# Patient Record
Sex: MALE | Race: WHITE | NOT HISPANIC OR LATINO | Employment: FULL TIME
[De-identification: names, ages, dates, MRNs, and addresses within clinical notes are randomized per-mention and may not be internally consistent; named-entity substitution may affect disease eponyms.]

---

## 2017-12-26 ENCOUNTER — RECORDS - HEALTHEAST (OUTPATIENT)
Dept: ADMINISTRATIVE | Facility: OTHER | Age: 37
End: 2017-12-26

## 2018-07-20 ENCOUNTER — RECORDS - HEALTHEAST (OUTPATIENT)
Dept: ADMINISTRATIVE | Facility: OTHER | Age: 38
End: 2018-07-20

## 2021-05-31 VITALS — HEIGHT: 64 IN | WEIGHT: 120 LBS | BODY MASS INDEX: 20.6 KG/M2 | BODY MASS INDEX: 20.6 KG/M2

## 2021-06-01 VITALS — BODY MASS INDEX: 21.46 KG/M2 | WEIGHT: 125 LBS

## 2021-06-13 ENCOUNTER — OFFICE VISIT (OUTPATIENT)
Dept: URGENT CARE | Facility: URGENT CARE | Age: 41
End: 2021-06-13

## 2021-06-13 VITALS
HEART RATE: 112 BPM | WEIGHT: 110 LBS | BODY MASS INDEX: 18.88 KG/M2 | TEMPERATURE: 98.2 F | DIASTOLIC BLOOD PRESSURE: 73 MMHG | OXYGEN SATURATION: 97 % | SYSTOLIC BLOOD PRESSURE: 114 MMHG

## 2021-06-13 DIAGNOSIS — R51.9 ACUTE NONINTRACTABLE HEADACHE, UNSPECIFIED HEADACHE TYPE: Primary | ICD-10-CM

## 2021-06-13 PROCEDURE — 99204 OFFICE O/P NEW MOD 45 MIN: CPT | Performed by: PHYSICIAN ASSISTANT

## 2021-06-13 RX ORDER — ONDANSETRON 4 MG/1
TABLET, FILM COATED ORAL
COMMUNITY
Start: 2020-08-11

## 2021-06-13 RX ORDER — PROMETHAZINE HYDROCHLORIDE 25 MG/1
12.5-25 TABLET ORAL
COMMUNITY
Start: 2021-04-15

## 2021-06-13 RX ORDER — PROPRANOLOL HYDROCHLORIDE 40 MG/1
40 TABLET ORAL
COMMUNITY
Start: 2021-06-04 | End: 2022-02-14

## 2021-06-13 RX ORDER — ZOLMITRIPTAN 5 MG/1
5 TABLET, FILM COATED ORAL
COMMUNITY
Start: 2021-06-04

## 2021-06-13 RX ORDER — HYDROCODONE BITARTRATE AND ACETAMINOPHEN 5; 325 MG/1; MG/1
1 TABLET ORAL
COMMUNITY
Start: 2021-02-18 | End: 2023-06-12

## 2021-06-13 RX ORDER — SUCRALFATE 1 G/1
1 TABLET ORAL
COMMUNITY
Start: 2020-07-15

## 2021-06-16 PROBLEM — R31.9 HEMATURIA: Status: ACTIVE | Noted: 2017-10-03

## 2021-06-16 PROBLEM — R10.9 RIGHT FLANK PAIN: Status: ACTIVE | Noted: 2017-10-03

## 2021-09-06 ENCOUNTER — HOSPITAL ENCOUNTER (EMERGENCY)
Facility: CLINIC | Age: 41
Discharge: LEFT BEFORE DISCHARGE | End: 2021-09-06

## 2021-09-06 VITALS
SYSTOLIC BLOOD PRESSURE: 107 MMHG | RESPIRATION RATE: 20 BRPM | WEIGHT: 120 LBS | DIASTOLIC BLOOD PRESSURE: 71 MMHG | OXYGEN SATURATION: 100 % | BODY MASS INDEX: 20.49 KG/M2 | HEART RATE: 102 BPM | HEIGHT: 64 IN | TEMPERATURE: 98 F

## 2021-09-06 ASSESSMENT — MIFFLIN-ST. JEOR: SCORE: 1365.32

## 2021-09-06 NOTE — ED TRIAGE NOTES
"History of headache and has had this frontal headache for 2 days. States has been out of \"emergency meds\" as prescribed by primary and was told if he ran out of those meds to go to ED and we will \"help him out\" for a couple of days. Has tried OTC meds but has not helped. Next appt with primary is this coming Friday  "

## 2021-11-30 ENCOUNTER — OFFICE VISIT (OUTPATIENT)
Dept: FAMILY MEDICINE | Facility: CLINIC | Age: 41
End: 2021-11-30

## 2021-11-30 VITALS
HEART RATE: 87 BPM | TEMPERATURE: 97.4 F | RESPIRATION RATE: 18 BRPM | WEIGHT: 119.93 LBS | DIASTOLIC BLOOD PRESSURE: 73 MMHG | BODY MASS INDEX: 20.59 KG/M2 | OXYGEN SATURATION: 98 % | SYSTOLIC BLOOD PRESSURE: 110 MMHG

## 2021-11-30 DIAGNOSIS — G43.719 INTRACTABLE CHRONIC MIGRAINE WITHOUT AURA AND WITHOUT STATUS MIGRAINOSUS: Primary | ICD-10-CM

## 2021-11-30 PROCEDURE — 99214 OFFICE O/P EST MOD 30 MIN: CPT | Performed by: PHYSICIAN ASSISTANT

## 2021-11-30 RX ORDER — HYDROCODONE BITARTRATE AND ACETAMINOPHEN 5; 325 MG/1; MG/1
1 TABLET ORAL EVERY 6 HOURS PRN
Qty: 2 TABLET | Refills: 0 | Status: SHIPPED | OUTPATIENT
Start: 2021-11-30 | End: 2021-12-01

## 2021-12-01 NOTE — PATIENT INSTRUCTIONS
Patient with history of chronic migraines who is on prophylactic and abortive medications. He gets [place] prescription by his PCP to control severe headaches. He is aware that I cannot refill is usual dose. I prescribed a one day supply of it. He needs to contact is PCP for any future refills as this is not appropriate for urgent care. Conservative measures discussed including rest and over-the-counter analgesics (Tylenol or Ibuprofen). See your primary care provider if symptoms do not improve in 5 days. Seek emergency care if you develop severe headache, stiff neck, vision changes, loss of consciousness, dizziness, or confusion.

## 2021-12-01 NOTE — PROGRESS NOTES
URGENT CARE VISIT:    SUBJECTIVE:   Jeff Chavez is a 41 year old male who comes in for evaluation of headache.  Headache began 2day(s)} ago.  DESCRIPTION OF HEADACHE:   Location of pain: bilateral   Character of pain:throbbing   Timing and onset: gradual onset and worsening  Radiation of pain: NO   Severity of pain: severe   Accompanying symptoms: none   Negative for: dizziness, focal weakness and sensory deficits    History of Migranes: Yes   Are most headaches similar in presentation: YES    TYPICAL PRECIPITANTS OF HEADACHE: none    CURRENT USE OF MEDS TO TREAT HA:   Abortive meds: zolmitriptan    Prophylactic meds: propanolol    PMH:   Past Medical History:   Diagnosis Date     Attention deficit disorder with hyperactivity 3/29/2015    Overview:  a system change updated this record. This will not affect patient care or billing. This comment can be deleted.       Closed fracture of part of tibia 12/2/2014     Equinus deformity of foot, acquired 1/23/2015     Major depressive disorder, single episode 3/29/2015     Migraine      Nephrolithiasis      Stomach ulcer      Tobacco use disorder 3/29/2015     Undersocialized conduct disorder, aggressive type 4/13/2007     Allergies: Nsaids (non-steroidal anti-inflammatory drug) [nsaids], Other food allergy, Strawberry, Tolmetin, and Tramadol   Medications:   Current Outpatient Medications   Medication Sig Dispense Refill     HYDROcodone-acetaminophen (NORCO) 5-325 MG tablet Take 1 tablet by mouth every 6 hours as needed for severe pain 2 tablet 0     HYDROcodone-acetaminophen (NORCO) 5-325 MG tablet Take 1 tablet by mouth       omeprazole (PRILOSEC) 20 MG DR capsule Take 20 mg by mouth       ondansetron (ZOFRAN) 4 MG tablet        promethazine (PHENERGAN) 25 MG tablet Take 12.5-25 mg by mouth       propranolol (INDERAL) 40 MG tablet Take 40 mg by mouth       sucralfate (CARAFATE) 1 GM tablet Take 1 g by mouth       ZOLMitriptan (ZOMIG) 5 MG tablet Take 5 mg by mouth        Social History:   Social History     Tobacco Use     Smoking status: Current Every Day Smoker     Packs/day: 1.00     Types: Cigarettes     Smokeless tobacco: Never Used   Substance Use Topics     Alcohol use: No     Comment: Alcoholic Drinks/day: rare       ROS:  General: negative   Skin: negative  Eyes: negative  Ears/Nose/Throat: negative  Respiratory: No shortness of breath, dyspnea on exertion, cough, or hemoptysis  Cardiovascular: negative  Gastrointestinal: negative  Genitourinary: negative  Musculoskeletal: negative  Neurologic: migraine headaches  Psychiatric: negative  Hematologic/Lymphatic/Immunologic: negative  Endocrine: negative      OBJECTIVE:  /73   Pulse 87   Temp 97.4  F (36.3  C) (Tympanic)   Resp 18   Wt 54.4 kg (119 lb 14.9 oz)   SpO2 98%   BMI 20.59 kg/m    GENERAL APPEARANCE: healthy, alert and no distress  EYES: EOMI,  PERRL, conjunctiva clear  RESP: lungs clear to auscultation - no rales, rhonchi or wheezes  CV: regular rates and rhythm, normal S1 S2, no murmur noted  NEURO: alert and oriented  SKIN: no suspicious lesions or rashes    ASSESSMENT:    ICD-10-CM    1. Intractable chronic migraine without aura and without status migrainosus  G43.719 HYDROcodone-acetaminophen (NORCO) 5-325 MG tablet       PLAN:  Patient Instructions   Patient with history of chronic migraines who is on prophylactic and abortive medications. He gets [place] prescription by his PCP to control severe headaches. He is aware that I cannot refill is usual dose. I prescribed a one day supply of it. He needs to contact is PCP for any future refills as this is not appropriate for urgent care. Conservative measures discussed including rest and over-the-counter analgesics (Tylenol or Ibuprofen). See your primary care provider if symptoms do not improve in 5 days. Seek emergency care if you develop severe headache, stiff neck, vision changes, loss of consciousness, dizziness, or confusion. Patient verbalized  understanding and is agreeable to plan. The patient was discharged ambulatory and in stable condition.    Emily Peña PA-C ....................  11/30/2021   6:27 PM

## 2021-12-29 ENCOUNTER — APPOINTMENT (OUTPATIENT)
Dept: CT IMAGING | Facility: CLINIC | Age: 41
End: 2021-12-29
Attending: EMERGENCY MEDICINE
Payer: COMMERCIAL

## 2021-12-29 ENCOUNTER — HOSPITAL ENCOUNTER (EMERGENCY)
Facility: CLINIC | Age: 41
Discharge: HOME | End: 2021-12-29
Attending: EMERGENCY MEDICINE | Admitting: EMERGENCY MEDICINE
Payer: COMMERCIAL

## 2021-12-29 VITALS
DIASTOLIC BLOOD PRESSURE: 84 MMHG | OXYGEN SATURATION: 98 % | RESPIRATION RATE: 16 BRPM | TEMPERATURE: 98.3 F | SYSTOLIC BLOOD PRESSURE: 131 MMHG | WEIGHT: 120 LBS | HEART RATE: 68 BPM | BODY MASS INDEX: 20.6 KG/M2

## 2021-12-29 DIAGNOSIS — K27.9 PEPTIC ULCER: ICD-10-CM

## 2021-12-29 LAB
ALBUMIN SERPL-MCNC: 4.4 G/DL (ref 3.5–5)
ALP SERPL-CCNC: 60 U/L (ref 45–120)
ALT SERPL W P-5'-P-CCNC: 16 U/L (ref 0–45)
ANION GAP SERPL CALCULATED.3IONS-SCNC: 12 MMOL/L (ref 5–18)
AST SERPL W P-5'-P-CCNC: 23 U/L (ref 0–40)
BASOPHILS # BLD AUTO: 0 10E3/UL (ref 0–0.2)
BASOPHILS NFR BLD AUTO: 0 %
BILIRUB DIRECT SERPL-MCNC: 0.2 MG/DL
BILIRUB SERPL-MCNC: 0.9 MG/DL (ref 0–1)
BUN SERPL-MCNC: 11 MG/DL (ref 8–22)
CALCIUM SERPL-MCNC: 9.2 MG/DL (ref 8.5–10.5)
CHLORIDE BLD-SCNC: 109 MMOL/L (ref 98–107)
CO2 SERPL-SCNC: 20 MMOL/L (ref 22–31)
CREAT SERPL-MCNC: 0.84 MG/DL (ref 0.7–1.3)
EOSINOPHIL # BLD AUTO: 0.1 10E3/UL (ref 0–0.7)
EOSINOPHIL NFR BLD AUTO: 1 %
ERYTHROCYTE [DISTWIDTH] IN BLOOD BY AUTOMATED COUNT: 12.3 % (ref 10–15)
GFR SERPL CREATININE-BSD FRML MDRD: >90 ML/MIN/1.73M2
GLUCOSE BLD-MCNC: 91 MG/DL (ref 70–125)
HCT VFR BLD AUTO: 43.6 % (ref 40–53)
HGB BLD-MCNC: 15.2 G/DL (ref 13.3–17.7)
IMM GRANULOCYTES # BLD: 0 10E3/UL
IMM GRANULOCYTES NFR BLD: 0 %
LIPASE SERPL-CCNC: 15 U/L (ref 0–52)
LYMPHOCYTES # BLD AUTO: 2.1 10E3/UL (ref 0.8–5.3)
LYMPHOCYTES NFR BLD AUTO: 23 %
MCH RBC QN AUTO: 32.6 PG (ref 26.5–33)
MCHC RBC AUTO-ENTMCNC: 34.9 G/DL (ref 31.5–36.5)
MCV RBC AUTO: 94 FL (ref 78–100)
MONOCYTES # BLD AUTO: 0.4 10E3/UL (ref 0–1.3)
MONOCYTES NFR BLD AUTO: 5 %
NEUTROPHILS # BLD AUTO: 6.4 10E3/UL (ref 1.6–8.3)
NEUTROPHILS NFR BLD AUTO: 71 %
NRBC # BLD AUTO: 0 10E3/UL
NRBC BLD AUTO-RTO: 0 /100
PLATELET # BLD AUTO: 163 10E3/UL (ref 150–450)
POTASSIUM BLD-SCNC: 3.9 MMOL/L (ref 3.5–5)
PROT SERPL-MCNC: 6.8 G/DL (ref 6–8)
RBC # BLD AUTO: 4.66 10E6/UL (ref 4.4–5.9)
SODIUM SERPL-SCNC: 141 MMOL/L (ref 136–145)
WBC # BLD AUTO: 9 10E3/UL (ref 4–11)

## 2021-12-29 PROCEDURE — 250N000011 HC RX IP 250 OP 636: Performed by: EMERGENCY MEDICINE

## 2021-12-29 PROCEDURE — 250N000013 HC RX MED GY IP 250 OP 250 PS 637: Performed by: EMERGENCY MEDICINE

## 2021-12-29 PROCEDURE — 82248 BILIRUBIN DIRECT: CPT | Performed by: PHYSICIAN ASSISTANT

## 2021-12-29 PROCEDURE — 85025 COMPLETE CBC W/AUTO DIFF WBC: CPT | Performed by: PHYSICIAN ASSISTANT

## 2021-12-29 PROCEDURE — 36415 COLL VENOUS BLD VENIPUNCTURE: CPT | Performed by: PHYSICIAN ASSISTANT

## 2021-12-29 PROCEDURE — 250N000009 HC RX 250: Performed by: EMERGENCY MEDICINE

## 2021-12-29 PROCEDURE — 83690 ASSAY OF LIPASE: CPT | Performed by: PHYSICIAN ASSISTANT

## 2021-12-29 PROCEDURE — 82310 ASSAY OF CALCIUM: CPT | Performed by: PHYSICIAN ASSISTANT

## 2021-12-29 PROCEDURE — 74177 CT ABD & PELVIS W/CONTRAST: CPT

## 2021-12-29 PROCEDURE — 99285 EMERGENCY DEPT VISIT HI MDM: CPT | Mod: 25

## 2021-12-29 RX ORDER — IOPAMIDOL 755 MG/ML
100 INJECTION, SOLUTION INTRAVASCULAR ONCE
Status: COMPLETED | OUTPATIENT
Start: 2021-12-29 | End: 2021-12-29

## 2021-12-29 RX ORDER — SUCRALFATE 1 G/1
1 TABLET ORAL 2 TIMES DAILY PRN
Qty: 20 TABLET | Refills: 0 | Status: SHIPPED | OUTPATIENT
Start: 2021-12-29

## 2021-12-29 RX ORDER — SUCRALFATE 1 G/1
1 TABLET ORAL ONCE
Status: COMPLETED | OUTPATIENT
Start: 2021-12-29 | End: 2021-12-29

## 2021-12-29 RX ORDER — HYDROCODONE BITARTRATE AND ACETAMINOPHEN 5; 325 MG/1; MG/1
1 TABLET ORAL EVERY 6 HOURS PRN
Qty: 12 TABLET | Refills: 0 | Status: SHIPPED | OUTPATIENT
Start: 2021-12-29 | End: 2022-01-01

## 2021-12-29 RX ADMIN — SUCRALFATE 1 G: 1 TABLET ORAL at 17:16

## 2021-12-29 RX ADMIN — LIDOCAINE HYDROCHLORIDE 30 ML: 20 SOLUTION TOPICAL at 17:40

## 2021-12-29 RX ADMIN — IOPAMIDOL 100 ML: 755 INJECTION, SOLUTION INTRAVENOUS at 18:20

## 2021-12-29 NOTE — ED TRIAGE NOTES
Patient is here with mid abdomen pain that started yesterday. He does have a hx of an ulcer and stated it feels the same.

## 2021-12-29 NOTE — ED PROVIDER NOTES
EMERGENCY DEPARTMENT ENCOUNTER            IMPRESSION:  Epigastric pain -suspect ulcer      MEDICAL DECISION MAKING:  Patient evaluated for epigastric abdominal pain.  He has a history of peptic ulcer disease.    On exam he appears uncomfortable.  He is tachycardic.  He has some tenderness epigastric no right upper quadrant.    He was given symptomatic medication    CBC, chemistry, lipase, LFTs are normal.    CT of the abdomen unremarkable.    He had some relief with the medications.    I suspect he has recurrent peptic ulcer disease.  He is stable and safe for discharge home.  I recommended increasing his PPI to twice a day.  He was given pain medication and Carafate.  He is to follow-up with primary care or GI.  Return precautions discussed      =================================================================  CHIEF COMPLAINT:  Chief Complaint   Patient presents with     Abdominal Pain         HPI  Jeff Chavez is a 41 year old male with a history of peptic ulcer disease, kidney stone, chronic gastritis, who presents to the ED solo via private vehicle for evaluation of abdominal pain.     Patient reports onset of epigastric abdominal pain beginning yesterday that comes and goes in waves. He has taken omeprazole, tylenol, TUMS, and lidocaine without relief. Patient endorses a history of ulcers and states that his current symptoms are the same as they were with previous ulcers. Denies any changes to bowel movements and states that he did eat a little today. Patient denies any nausea, and any other symptoms or complaints at this time.     ScHx: Patient denies any alcohol consumption.       I, Beata Goel am serving as a scribe to document services personally performed by Dr. Sumanth Guillermo MD, based on my observation and the provider's statements to me. I, Dr. Sumanth Guillermo MD attest that Beata Goel is acting in a scribe capacity, has observed my performance of the services and has documented them in accordance  with my direction.      REVIEW OF SYSTEMS   Constitutional: Denies fever, chills, unintentional weight loss or fatigue   Eyes: Denies visual changes or discharge    HENT: Denies sore throat, ear pain or neck pain  Respiratory: Denies cough or shortness of breath    Cardiovascular: Denies chest pain, palpitations or leg swelling  GI: Denies nausea, vomiting, or dark, bloody stools. Endorses epigastric abdominal pain.   : Denies hematuria, dysuria, or flank pain  Musculoskeletal: Denies any new back pain or new muscle/joint pains  Skin: Denies rash or wound  Neurologic: Denies current headache, new weakness, focal weakness, or sensory changes    Lymphatic: Denies swollen glands    Psychiatric: Denies depression, suicidal ideation or homicidal ideation.    Remainder of systems reviewed, unless noted in HPI all others negative.      PAST MEDICAL HISTORY:  Past Medical History:   Diagnosis Date     Attention deficit disorder with hyperactivity 3/29/2015    Overview:  a system change updated this record. This will not affect patient care or billing. This comment can be deleted.       Closed fracture of part of tibia 12/2/2014     Equinus deformity of foot, acquired 1/23/2015     Major depressive disorder, single episode 3/29/2015     Migraine      Nephrolithiasis      Stomach ulcer      Tobacco use disorder 3/29/2015     Undersocialized conduct disorder, aggressive type 4/13/2007       PAST SURGICAL HISTORY:  No past surgical history on file.      CURRENT MEDICATIONS:    HYDROcodone-acetaminophen (NORCO) 5-325 MG tablet  sucralfate (CARAFATE) 1 GM tablet  HYDROcodone-acetaminophen (NORCO) 5-325 MG tablet  omeprazole (PRILOSEC) 20 MG DR capsule  ondansetron (ZOFRAN) 4 MG tablet  promethazine (PHENERGAN) 25 MG tablet  propranolol (INDERAL) 40 MG tablet  sucralfate (CARAFATE) 1 GM tablet  ZOLMitriptan (ZOMIG) 5 MG tablet        ALLERGIES:  Allergies   Allergen Reactions     Nsaids (Non-Steroidal Anti-Inflammatory Drug)  "[Nsaids] Unknown     Informed NOT to use due to last bone graft.  Please check with patient before ordering or administering to patient .     Other Food Allergy Hives     Strawberries     Strawberry Hives     Tolmetin Other (See Comments)     Informed NOT to use due to last bone graft.  Please check with patient before ordering or administering to patient ., Informed NOT to use due to last bone graft.  Please check with patient before ordering or administering to patient .     Tramadol Other (See Comments)     \"seizures\"       FAMILY HISTORY:  No family history on file.    SOCIAL HISTORY:   Social History     Socioeconomic History     Marital status: Single     Spouse name: Not on file     Number of children: Not on file     Years of education: Not on file     Highest education level: Not on file   Occupational History     Not on file   Tobacco Use     Smoking status: Current Every Day Smoker     Packs/day: 1.00     Types: Cigarettes     Smokeless tobacco: Never Used   Substance and Sexual Activity     Alcohol use: No     Comment: Alcoholic Drinks/day: rare     Drug use: No     Sexual activity: Not on file   Other Topics Concern     Not on file   Social History Narrative     Not on file     Social Determinants of Health     Financial Resource Strain: Not on file   Food Insecurity: Not on file   Transportation Needs: Not on file   Physical Activity: Not on file   Stress: Not on file   Social Connections: Not on file   Intimate Partner Violence: Not on file   Housing Stability: Not on file       PHYSICAL EXAM:    /84   Pulse 105   Temp 98.3  F (36.8  C) (Oral)   Resp 16   Wt 54.4 kg (120 lb)   SpO2 100%   BMI 20.60 kg/m      Constitutional: Awake, alert, in no acute distress  Head: Normocephalic, atraumatic.  ENT: Mucous membranes moist. Posterior oropharynx appears normal.  Eyes: Pupils midrange and reactive ,no conjunctival discharge  Neck: No lymphadenopathy, no stridor, supple, soft tissue " swelling  Chest: No tenderness   Respiratory: Respirations even, unlabored. Lungs clear to ascultation bilaterally, in no acute respiratory distress.  Cardiovascular: Regular rate and rhythm.+2 radial pulses, equal bilaterally.  No murmurs.   GI: Abdomen soft, tender epigastric. No guarding or rebound. Bowel sounds intact on all 4 quadrants.   Back: No CVA tenderness.    Musculoskeletal: Moves all 4 extremities equally, strength symmetrical on bilateral uppers and lowers.  No peripheral edema  Integument: Warm, dry. No rash. No bruising or petechiae.  Lymphatic: No cervical lymphadenopathy  Neurologic: Alert & oriented x 3.   Psychiatric: Normal mood and affect. Normal judgement.    ED COURSE:    4:58 PM I met with the patient for the initial interview and physical examination. Discussed plan for treatment and workup in the ED.      LAB:  All pertinent labs reviewed and interpreted.  Results for orders placed or performed during the hospital encounter of 12/29/21   CT Abdomen Pelvis w Contrast    Impression    IMPRESSION:   1.  Negative CT of the abdomen and pelvis. No change from previous.   Basic metabolic panel   Result Value Ref Range    Sodium 141 136 - 145 mmol/L    Potassium 3.9 3.5 - 5.0 mmol/L    Chloride 109 (H) 98 - 107 mmol/L    Carbon Dioxide (CO2) 20 (L) 22 - 31 mmol/L    Anion Gap 12 5 - 18 mmol/L    Urea Nitrogen 11 8 - 22 mg/dL    Creatinine 0.84 0.70 - 1.30 mg/dL    Calcium 9.2 8.5 - 10.5 mg/dL    Glucose 91 70 - 125 mg/dL    GFR Estimate >90 >60 mL/min/1.73m2   Hepatic function panel   Result Value Ref Range    Bilirubin Total 0.9 0.0 - 1.0 mg/dL    Bilirubin Direct 0.2 <=0.5 mg/dL    Protein Total 6.8 6.0 - 8.0 g/dL    Albumin 4.4 3.5 - 5.0 g/dL    Alkaline Phosphatase 60 45 - 120 U/L    AST 23 0 - 40 U/L    ALT 16 0 - 45 U/L   Result Value Ref Range    Lipase 15 0 - 52 U/L   CBC with platelets and differential   Result Value Ref Range    WBC Count 9.0 4.0 - 11.0 10e3/uL    RBC Count 4.66 4.40 -  5.90 10e6/uL    Hemoglobin 15.2 13.3 - 17.7 g/dL    Hematocrit 43.6 40.0 - 53.0 %    MCV 94 78 - 100 fL    MCH 32.6 26.5 - 33.0 pg    MCHC 34.9 31.5 - 36.5 g/dL    RDW 12.3 10.0 - 15.0 %    Platelet Count 163 150 - 450 10e3/uL    % Neutrophils 71 %    % Lymphocytes 23 %    % Monocytes 5 %    % Eosinophils 1 %    % Basophils 0 %    % Immature Granulocytes 0 %    NRBCs per 100 WBC 0 <1 /100    Absolute Neutrophils 6.4 1.6 - 8.3 10e3/uL    Absolute Lymphocytes 2.1 0.8 - 5.3 10e3/uL    Absolute Monocytes 0.4 0.0 - 1.3 10e3/uL    Absolute Eosinophils 0.1 0.0 - 0.7 10e3/uL    Absolute Basophils 0.0 0.0 - 0.2 10e3/uL    Absolute Immature Granulocytes 0.0 <=0.4 10e3/uL    Absolute NRBCs 0.0 10e3/uL       RADIOLOGY:  Reviewed all pertinent imaging. Please see official radiology report.  CT Abdomen Pelvis w Contrast   Preliminary Result   IMPRESSION:    1.  Negative CT of the abdomen and pelvis. No change from previous.               MEDICATIONS GIVEN IN THE EMERGENCY:  Medications   lidocaine (viscous) (XYLOCAINE) 2 % 15 mL, alum & mag hydroxide-simethicone (MAALOX) 15 mL GI Cocktail (30 mLs Oral Given 12/29/21 1740)   sucralfate (CARAFATE) tablet 1 g (1 g Oral Given 12/29/21 1716)   iopamidol (ISOVUE-370) solution 100 mL (100 mLs Intravenous Given 12/29/21 1820)           NEW PRESCRIPTIONS STARTED AT TODAY'S ER VISIT:  New Prescriptions    HYDROCODONE-ACETAMINOPHEN (NORCO) 5-325 MG TABLET    Take 1 tablet by mouth every 6 hours as needed for pain    SUCRALFATE (CARAFATE) 1 GM TABLET    Take 1 tablet (1 g) by mouth 2 times daily as needed          FINAL DIAGNOSIS:    ICD-10-CM    1. Peptic ulcer  K27.9             At the conclusion of the encounter I discussed the results of all of the tests and the disposition. The questions were answered. The patient or family acknowledged understanding and was agreeable with the care plan.     NAME: Jeff Chavez  AGE: 41 year old male  YOB: 1980  MRN:  [id number]  EVALUATION DATE & TIME: 12/29/2021  4:39 PM    PCP: Pavan Billings    ED PROVIDER: Sumanth Guillermo M.D.      I, Beata Goel, am serving as a scribe to document services personally performed by Dr. Sumanth Guillermo based on my observation and the provider's statements to me. I, Sumanth Guillermo MD attest that Beata Goel is acting in a scribe capacity, has observed my performance of the services and has documented them in accordance with my direction.    Sumanth Guillermo M.D.  Emergency Medicine  [facility] EMERGENCY ROOM  [facility] [facility] [address]  [phone]  Dept: [phone]  12/29/2021        Sumanth Guillermo MD  12/29/21 1850

## 2021-12-30 NOTE — DISCHARGE INSTRUCTIONS
Increase your omeprazole to twice a day.  Take Carafate as needed.  Vicodin for pain.  You should follow-up with your primary care doctor for reevaluation and possible referral to GI.  Return if your pain worsens or if you have persistent vomiting

## 2022-02-14 ENCOUNTER — OFFICE VISIT (OUTPATIENT)
Dept: FAMILY MEDICINE | Facility: CLINIC | Age: 42
End: 2022-02-14
Payer: COMMERCIAL

## 2022-02-14 VITALS
WEIGHT: 113 LBS | BODY MASS INDEX: 19.4 KG/M2 | TEMPERATURE: 98.4 F | HEART RATE: 91 BPM | DIASTOLIC BLOOD PRESSURE: 72 MMHG | SYSTOLIC BLOOD PRESSURE: 111 MMHG | OXYGEN SATURATION: 100 %

## 2022-02-14 DIAGNOSIS — R51.9 CHRONIC INTRACTABLE HEADACHE, UNSPECIFIED HEADACHE TYPE: Primary | ICD-10-CM

## 2022-02-14 DIAGNOSIS — G89.29 CHRONIC INTRACTABLE HEADACHE, UNSPECIFIED HEADACHE TYPE: Primary | ICD-10-CM

## 2022-02-14 PROCEDURE — 99213 OFFICE O/P EST LOW 20 MIN: CPT | Performed by: NURSE PRACTITIONER

## 2022-02-14 RX ORDER — NICOTINE 21 MG/24HR
1 PATCH, TRANSDERMAL 24 HOURS TRANSDERMAL EVERY 24 HOURS
COMMUNITY
Start: 2021-06-18

## 2022-02-14 RX ORDER — OMEGA-3 FATTY ACIDS/FISH OIL 300-1000MG
200 CAPSULE ORAL PRN
COMMUNITY

## 2022-02-14 ASSESSMENT — ENCOUNTER SYMPTOMS
FEVER: 0
COUGH: 0
NAUSEA: 1

## 2022-02-14 NOTE — PROGRESS NOTES
"Assessment & Plan     Chronic intractable headache, unspecified headache type         Patient with typical migraine lasting over 24 hours.  Some confusion of whether his high dose propranolol was actually stopped. He says he tapered this down.   I see no evidence of this via care everywhere.  He was not put on another preventative despite chronic migraines.  Patient should clarify with his PCP who is outside of our system and start another preventative based on frequency of headaches.    Patient plans to schedule a follow-up appointment with neurology for his chronic headaches now that insurance is active.    For the current headache, unfortunately, with maximum use of NSAIDs and triptans today, we have very little else to treat migraines in our urgent care.  With peptic ulcer disease, steroids would not be a great choice either.    Recommended emergency room for further care.  Patient says he will go to [facility] ED.    No red flags noted on exam.          No follow-ups on file.    Melissa Tenorio [facility]    Yonny Aguilar is a 42 year old male who presents to clinic today for the following health issues:  Chief Complaint   Patient presents with     Headache     migraine x 1 day      HPI    Patient with a history of migraine presents to the walk-in clinic with migraine.  Typical migraine.  Center of forehead.  Sharp, throbbing.  Currently 7/10.        Of note, he was also diagnosed with peptic ulcer disease on December 29, 2021. Was taking motrin \"all day\" today.       Previously was getting hydrocodone from PCP for chronic migraines.  He has no preventative medication (was previously on Propranolol 80 mg bid  and this was stopped by PCP, but no record of this was found) and zolmitriptan - took maximum dose already today.    Haven't seen neurologist in a year.  Didn't have insurance until recently.          Review of Systems   Constitutional: Negative for fever.   HENT: " Negative for congestion.    Eyes:        +Problems with sound    Respiratory: Negative for cough.    Cardiovascular: Negative for chest pain and palpitations.   Gastrointestinal: Positive for nausea.           Objective    /72 (BP Location: Right arm, Patient Position: Right side, Cuff Size: Adult Small)   Pulse 91   Temp 98.4  F (36.9  C) (Tympanic)   Wt 51.3 kg (113 lb)   SpO2 100%   BMI 19.40 kg/m    Physical Exam  Constitutional:       Appearance: He is well-developed.   HENT:      Right Ear: External ear normal.      Left Ear: External ear normal.   Eyes:      General:         Right eye: No discharge.         Left eye: No discharge.      Conjunctiva/sclera: Conjunctivae normal.      Pupils: Pupils are equal, round, and reactive to light.   Cardiovascular:      Pulses: Normal pulses.   Pulmonary:      Effort: Pulmonary effort is normal.   Musculoskeletal:         General: Normal range of motion.   Skin:     General: Skin is warm.   Neurological:      Mental Status: He is alert and oriented to person, place, and time.      Motor: No weakness.      Gait: Gait normal.   Psychiatric:         Mood and Affect: Mood normal.         Behavior: Behavior normal.         Thought Content: Thought content normal.         Judgment: Judgment normal.

## 2022-02-14 NOTE — PATIENT INSTRUCTIONS
Call Dr. Painter's office to clarify propranolol (no indication in your chart that he stopped it) or other preventative medication and referral for neurology.

## 2022-02-17 ASSESSMENT — ENCOUNTER SYMPTOMS: PALPITATIONS: 0

## 2022-02-27 ENCOUNTER — OFFICE VISIT (OUTPATIENT)
Dept: FAMILY MEDICINE | Facility: CLINIC | Age: 42
End: 2022-02-27
Payer: COMMERCIAL

## 2022-02-27 VITALS
TEMPERATURE: 97.5 F | OXYGEN SATURATION: 100 % | HEART RATE: 97 BPM | DIASTOLIC BLOOD PRESSURE: 79 MMHG | SYSTOLIC BLOOD PRESSURE: 125 MMHG

## 2022-02-27 DIAGNOSIS — R11.2 NAUSEA AND VOMITING, INTRACTABILITY OF VOMITING NOT SPECIFIED, UNSPECIFIED VOMITING TYPE: Primary | ICD-10-CM

## 2022-02-27 DIAGNOSIS — G43.009 MIGRAINE WITHOUT AURA AND WITHOUT STATUS MIGRAINOSUS, NOT INTRACTABLE: ICD-10-CM

## 2022-02-27 PROCEDURE — 99213 OFFICE O/P EST LOW 20 MIN: CPT | Performed by: FAMILY MEDICINE

## 2022-02-27 RX ORDER — HYDROCODONE BITARTRATE AND ACETAMINOPHEN 5; 325 MG/1; MG/1
1 TABLET ORAL EVERY 8 HOURS PRN
Qty: 6 TABLET | Refills: 0 | Status: SHIPPED | OUTPATIENT
Start: 2022-02-27 | End: 2022-03-01

## 2022-02-27 RX ORDER — ONDANSETRON 4 MG/1
4 TABLET, ORALLY DISINTEGRATING ORAL EVERY 8 HOURS PRN
Qty: 10 TABLET | Refills: 0 | Status: SHIPPED | OUTPATIENT
Start: 2022-02-27 | End: 2023-06-12

## 2022-02-27 NOTE — PATIENT INSTRUCTIONS
Patient Education     About Migraine Headaches  What is a migraine headache?  A migraine is a special kind of headache. It can last a for hours or days. It may cause intense pain. You may also feel sick to your stomach or have eye problems.  What causes it?  We don't know the exact cause. They may be caused by a problem with blood flow to the brain. Or they may happen when brain chemicals don't stay balanced. You are more likely to get a migraine when:    You are under stress    You are tired    You eat some kinds of foods, such as wine, cheese, chocolate or chemicals added to foods    You are around bright lights  Women are more likely to have migraines than men. Sometimes the headaches happen around the time a woman has her period. Or they may happen when a woman is taking hormone pills.   Migraines can run in families.  What are symptoms?  Before a migraine, you may:    Not feel well    Lose part of your vision    See bright spots    See zigzags in front of your eyes  Most of the time, these problems go away when the headache starts. When you have a migraine, you may:    Have a headache that throbs or pounds (you may feel the pain more on one side of your head, or your whole head may hurt)    Be very sensitive to light    Have blurry vision    Vomit (throw up) or have nausea (feel sick to your stomach)    Have numbness or tingling in your face or arm  How is it diagnosed?  Your care team will ask you about your symptoms and medical history. They will examine you.   It may help to keep a headache diary. You should write down:    The date and time of each headache    How long it lasts    The type of pain (is it dull or sharp? Does it throb? Do you feel pressure?)    Where it hurts (for example, scalp, eyes, temples, neck)    What symptoms you had before the headache started    What you ate and drank before the headache    If you used cigarettes, caffeine, alcohol or soda    What time you went to bed the night  before, and what time you woke up that day    If you are a woman, you should also note if you are using birth control pills or taking other female hormones  If you just recently started having headaches, your care team may suggest tests to look for the causes of your symptoms. You may need a brain scan or MRI.  How is it treated?    You may need to take medicines to keep migraines from getting worse once they start. Take these medicines as soon as you can when you start to have signs of a migraine.    You may need to take another medicine every day to stop migraines from coming so often. The medicine can help prevent very bad migraines.    You may need to try a medicine for a few weeks to see if it works. Be sure to keep your follow-up appointments with your care team to see if a medicine is working and what you should try next. Talk to your care team about what is best for you. You may have many choices.  How long will it last?  The headache may last from a few hours to a few days. You may get migraines for the rest of your life. Most of the time, migraines happen less often as you get older.  How can I take care of myself?  As soon as the migraine starts:    Take a pain reliever. Ask your care team what medicine you should take.    Rest in a quiet, dark room until you start to feel better.    Don't drive a car while you have a migraine.    See your care team if your headaches get worse or if they don't get better when you take medicine for them. It may take a few visits to find the best way to control your headaches.  How can I prevent migraines?    Eat regular, healthy meals. Don't go too long without eating. Stay away from foods that seem to cause headaches. Watch out for:  ? Wine, staci and beer  ? Cheese  ? Aged, canned and processed meats  ? Bread made with yeast  ? Foods with cheese, chocolate or nuts    Don't use medicines that can cause headaches. Ask your care team about this. You may need to stop using  birth control or hormone pills.    Don't smoke cigarettes    Get plenty of sleep every day    Lower your stress. Find time to relax, rest and have fun in your life.  When should I call my care team?  Call your care team right away if you have symptoms that you don't usually get with migraines or you have other unusual symptoms, such as:    Problems talking or your speech is slurred    A weak arm or leg that you can't move in a normal way    A fever higher than 100 F (37.8 C)    Stiff neck    Vomiting that lasts for a few hours  For more information  National Headache Foundation (NHF)  www.headaches.org.  For informational purposes only. Not to replace the advice of your health care provider.   Copyright   2011 [employer]. All rights reserved. Clinically reviewed by [facility]. [employer] [id number] - 08/18.

## 2022-02-27 NOTE — PROGRESS NOTES
SUBJECTIVE:  Jeff Chavez, a 42 year old male scheduled an appointment to discuss the following issues:     Nausea and vomiting, intractability of vomiting not specified, unspecified vomiting type  Migraine without aura and without status migrainosus, not intractable    Medical, social, surgical, and family histories reviewed.     Headache (Migraine ongoing x2 days.)    History of chronic recurrent migraines since age 18.  Usual migraine is characterized by frontal headache which is throbbing with decreased sleep, light sensitivity & smell sensitivity but no aura.  No visual or speech disturbances, no paresthesia or saddle anesthesia.  Patient tells me that he cannot tolerate NSAIDs, Toradol has not worked last took Motrin 800 mg half an hour ago.  Zofran has helped nausea in the past. Previously the only cocktail that worked for him  Or his persistent migraine were Zomig+Phenergan+hydrocodone.  He will be seeing his primary care physician tomorrow and is asking for some relief at this time.  There has not been any changes of his migraine characteristics.  No head trauma or injury.  Denies any alcohol or drug involvement.  Patient tells me he has cut down on his caffeine consumption.    ROS:  See HPI.  Some nausea but no vomiting.  no fever/chills.  No chest pain/SOB.  No BM/urine problems.  No dizziness or syncope.      OBJECTIVE:  /79 (BP Location: Right arm, Patient Position: Sitting, Cuff Size: Adult Regular)   Pulse 97   Temp 97.5  F (36.4  C) (Tympanic)   SpO2 100%   EXAM:  GENERAL APPEARANCE: alert and moderate distress; afebrile, no cyanosis or accessory muscle use, no meningeal signs, not septic, appears uncomfortable  EYES: Eyes grossly normal to inspection, PERRL and conjunctivae and sclerae normal; no nystagmus, normal EOM  HENT: ear canals and TM's normal and nose and mouth without ulcers or lesions  NECK: no adenopathy, no asymmetry, masses, or scars and thyroid normal to palpation  RESP:  lungs clear to auscultation - no rales, rhonchi or wheezes  CV: regular rates and rhythm, normal S1 S2, no S3 or S4 and no murmur, click or rub  LYMPHATICS: no cervical adenopathy  ABDOMEN: soft, nontender, without hepatosplenomegaly or masses and bowel sounds normal  MS: extremities normal- no gross deformities noted  SKIN: no suspicious lesions or rashes  NEURO: Normal strength and tone, mentation intact and speech normal; no focal neurological deficits, no facial asymmetry or pronator drift, normal gait; normal heel and toe walk      ASSESSMENT/PLAN:  (R11.2) Nausea and vomiting, intractability of vomiting not specified, unspecified vomiting type  (primary encounter diagnosis)  Plan: ondansetron (ZOFRAN-ODT) 4 MG ODT tab      (G43.009) Migraine without aura and without status migrainosus, not intractable  Plan: HYDROcodone-acetaminophen (NORCO) 5-325 MG         tablet      Care instructions given.  Patient to stay hydrated with adequate fluid intake.  Pt to f/up PCP as scheduled, be seen sooner if no improvement or worsening.  Warning signs and symptoms explained.

## 2022-06-28 ENCOUNTER — HOSPITAL ENCOUNTER (EMERGENCY)
Facility: HOSPITAL | Age: 42
Discharge: HOME | End: 2022-06-28
Attending: EMERGENCY MEDICINE | Admitting: EMERGENCY MEDICINE
Payer: COMMERCIAL

## 2022-06-28 VITALS
TEMPERATURE: 99.5 F | SYSTOLIC BLOOD PRESSURE: 115 MMHG | BODY MASS INDEX: 20.6 KG/M2 | OXYGEN SATURATION: 98 % | DIASTOLIC BLOOD PRESSURE: 66 MMHG | RESPIRATION RATE: 16 BRPM | HEART RATE: 79 BPM | WEIGHT: 120 LBS

## 2022-06-28 DIAGNOSIS — F11.93 OPIATE WITHDRAWAL (H): ICD-10-CM

## 2022-06-28 PROCEDURE — 99284 EMERGENCY DEPT VISIT MOD MDM: CPT

## 2022-06-28 PROCEDURE — 250N000013 HC RX MED GY IP 250 OP 250 PS 637: Performed by: EMERGENCY MEDICINE

## 2022-06-28 RX ORDER — BUPRENORPHINE AND NALOXONE 4; 1 MG/1; MG/1
1 FILM, SOLUBLE BUCCAL; SUBLINGUAL DAILY
Status: DISCONTINUED | OUTPATIENT
Start: 2022-06-28 | End: 2022-06-28 | Stop reason: CLARIF

## 2022-06-28 RX ORDER — BUPRENORPHINE HYDROCHLORIDE AND NALOXONE HYDROCHLORIDE DIHYDRATE 2; .5 MG/1; MG/1
2 TABLET SUBLINGUAL ONCE
Status: COMPLETED | OUTPATIENT
Start: 2022-06-28 | End: 2022-06-28

## 2022-06-28 RX ORDER — BUPRENORPHINE AND NALOXONE 4; 1 MG/1; MG/1
1 FILM, SOLUBLE BUCCAL; SUBLINGUAL 2 TIMES DAILY
Qty: 10 FILM | Refills: 0 | Status: SHIPPED | OUTPATIENT
Start: 2022-06-28 | End: 2023-06-12

## 2022-06-28 RX ADMIN — BUPRENORPHINE HYDROCHLORIDE AND NALOXONE HYDROCHLORIDE DIHYDRATE 2 TABLET: 2; .5 TABLET SUBLINGUAL at 17:55

## 2022-06-28 ASSESSMENT — ENCOUNTER SYMPTOMS
NERVOUS/ANXIOUS: 1
NAUSEA: 0
HEADACHES: 0
AGITATION: 1
VOMITING: 0
SLEEP DISTURBANCE: 1
DIARRHEA: 0
EYE DISCHARGE: 0
SHORTNESS OF BREATH: 0
MYALGIAS: 0
TROUBLE SWALLOWING: 0
WOUND: 0
CHILLS: 0
RHINORRHEA: 0

## 2022-06-28 NOTE — ED NOTES
"ED Provider In Triage Note  [facility]  Encounter Date: Jun 28, 2022    Chief Complaint   Patient presents with     Withdrawal     Brief HPI:   Jeff Chavez is a pleasant 42 year old male presenting to the Emergency Department with a chief complaint of \"opiate withdrawal\".  Body aches, insomnia, restless.  Nausea.  PMhx oral oxycodone abuse.  Last opiates on Sunday evening, wants to stop.   Has appointment for suboxone initiation on Friday set up by PCP.  Referred to ED by pcp secondary to current symptoms. Patient looking to bridge until that follow up.      Note: Suboxone 4 mg ordered from triage    Brief Physical Exam:  BP (!) 144/66   Pulse (!) 123   Temp 99.5  F (37.5  C) (Temporal)   Wt 54.4 kg (120 lb)   SpO2 97%   BMI 20.60 kg/m    General: Non-toxic appearing  HEENT: Atraumatic  Resp: No respiratory distress  Abdomen: Non-peritoneal  Neuro: Alert, oriented, answers questions appropriately  Psych: Behavior appropriate    Plan Initiated in Triage:  suboxone ordered    PIT Dispo:   [facility] while awaiting workup and ED bed availability    Duncan Andino MD on 6/28/2022 at 5:29 PM    Patient was evaluated by the Physician in Triage due to a limitation of available rooms in the Emergency Department. A plan of care was discussed based on the information obtained on the initial evaluation and patient was consuled to return back to the Emergency Department lobby after this initial evalutaiton until results were obtained or a room became available in the Emergency Department. Patient was counseled not to leave prior to receiving the results of their workup.     Duncan Andino MD  [facility] EMERGENCY DEPARTMENT  [address] [place] [id number]  [phone]       Duncan Andino MD  06/28/22 1735    "

## 2022-06-28 NOTE — ED PROVIDER NOTES
EMERGENCY DEPARTMENT ENCOUNTER      NAME: Jeff Chavez  AGE: 42 year old male  YOB: 1980  MRN: [id number]  EVALUATION DATE & TIME: 6/28/2022  6:43 PM    PCP: Pavan Billings    ED PROVIDER: Zheng Louise MD        Chief Complaint   Patient presents with     Withdrawal         FINAL IMPRESSION:  1. Opiate withdrawal (H)          ED COURSE & MEDICAL DECISION MAKING:    Pertinent Labs & Imaging studies reviewed. (See chart for details)  42 year old male presents to the Emergency Department for evaluation of symptoms consistent with opiate withdrawal and requesting Suboxone.     History of opiate use disorder.  Last use on Sunday.  Uses oral pills no injections.  Scheduled to see addiction medicine for Suboxone initiation in 4 days from now.  Has myalgias, anxiousness, cramps, tearing, yawning, restlessness and tremors.     Got Suboxone in triage and now feels much better.  Current COWS score 4.     Discussed Suboxone.  Patient wants to initiate treatment.  No known contraindications. Will prescribe twice a day for 5 days to bridge through his next appointment.  Will prescribe Narcan.  Discussed with patient he needs to teach his friends and family how to use Narcan.     At the conclusion of the encounter I discussed the results of all of the tests and the disposition. The questions were answered. The patient or family acknowledged understanding and was agreeable with the care plan.      7:00 PM I met with patient for initial encounter.          MEDICATIONS GIVEN IN THE EMERGENCY:  Medications   buprenorphine-naloxone (SUBOXONE) 2-0.5 MG sublingual tablet 2 tablet (2 tablets Sublingual Given 6/28/22 1755)       NEW PRESCRIPTIONS STARTED AT TODAY'S ER VISIT  New Prescriptions    BUPRENORPHINE HCL-NALOXONE HCL (SUBOXONE) 4-1 MG PER FILM    Place 1 Film under the tongue 2 times daily    NALOXONE (NARCAN) 4 MG/0.1ML NASAL SPRAY    Spray 1 spray (4 mg) into one nostril alternating nostrils as needed for  "opioid reversal every 2-3 minutes until assistance arrives          =================================================================    HPI    Triage note  \"  Pt states withdrawing from opiates. Has appt on Friday at  Clinic,  Last used oxycodone on Sunday.     Triage Assessment     Row Name 06/28/22 1731       Triage Assessment (Adult)    Airway WDL WDL              \"      Patient information was obtained from: Patient    Use of : N/A         Jeff Chavez is a 42 year old male who presents to this ED via walk-in for evaluation of withdrawal.    Patient states he is currently in recovery for opioid addiction and that he last took oxycodone on Sunday afternoon. Patient reports he has been feeling agitated, anxious, fidgety, and is having body aches. He states he was sweating and unable to sleep last night. He states he has a history of ulcers and migraines.    Patient denies chills, NVD, and all other relevant symptoms.       REVIEW OF SYSTEMS   Review of Systems   Constitutional: Negative for chills.   HENT: Negative for rhinorrhea and trouble swallowing.    Eyes: Negative for discharge.   Respiratory: Negative for shortness of breath.    Gastrointestinal: Negative for diarrhea, nausea and vomiting.   Musculoskeletal: Negative for myalgias.        Positive for body aches.   Skin: Negative for wound.   Neurological: Negative for headaches.   Psychiatric/Behavioral: Positive for agitation and sleep disturbance. The patient is nervous/anxious.        PAST MEDICAL HISTORY:  Past Medical History:   Diagnosis Date     Attention deficit disorder with hyperactivity 3/29/2015    Overview:  a system change updated this record. This will not affect patient care or billing. This comment can be deleted.       Closed fracture of part of tibia 12/2/2014     Equinus deformity of foot, acquired 1/23/2015     Major depressive disorder, single episode 3/29/2015     Migraine      Nephrolithiasis      Stomach ulcer      " "Tobacco use disorder 3/29/2015     Undersocialized conduct disorder, aggressive type 4/13/2007       PAST SURGICAL HISTORY:  History reviewed. No pertinent surgical history.        CURRENT MEDICATIONS:    buprenorphine HCl-naloxone HCl (SUBOXONE) 4-1 MG per film  naloxone (NARCAN) 4 MG/0.1ML nasal spray  HYDROcodone-acetaminophen (NORCO) 5-325 MG tablet  ibuprofen (ADVIL/MOTRIN) 200 MG capsule  nicotine (NICODERM CQ) 21 MG/24HR 24 hr patch  omeprazole (PRILOSEC) 20 MG DR capsule  ondansetron (ZOFRAN) 4 MG tablet  ondansetron (ZOFRAN-ODT) 4 MG ODT tab  promethazine (PHENERGAN) 25 MG tablet  sucralfate (CARAFATE) 1 GM tablet  sucralfate (CARAFATE) 1 GM tablet  ZOLMitriptan (ZOMIG) 5 MG tablet        ALLERGIES:  Allergies   Allergen Reactions     Other Food Allergy Hives     Strawberries     Strawberry Hives     Tramadol Other (See Comments)     \"seizures\"       FAMILY HISTORY:  No family history on file.    SOCIAL HISTORY:   Social History     Socioeconomic History     Marital status: Single     Spouse name: None     Number of children: None     Years of education: None     Highest education level: None   Tobacco Use     Smoking status: Former Smoker     Packs/day: 1.00     Types: Cigarettes     Smokeless tobacco: Never Used   Substance and Sexual Activity     Alcohol use: No     Comment: Alcoholic Drinks/day: rare     Drug use: No       VITALS:  /71   Pulse 80   Temp 99.5  F (37.5  C) (Temporal)   Wt 54.4 kg (120 lb)   SpO2 98%   BMI 20.60 kg/m      PHYSICAL EXAM      Vitals: /71   Pulse 80   Temp 99.5  F (37.5  C) (Temporal)   Wt 54.4 kg (120 lb)   SpO2 98%   BMI 20.60 kg/m    General: Appears in no acute distress, awake, alert, interactive.  Eyes: Conjunctivae non-injected. Sclera anicteric.  HENT: Atraumatic.  Pupils midsized.  No yawning  Neck: Supple.  Respiratory/Chest: Respiration unlabored.  Abdomen: non distended  Musculoskeletal: Normal extremities. No edema or erythema.  Skin: Normal " color. No rash or diaphoresis.  No piloerection  Neurologic: Face symmetric, moves all extremities spontaneously.  Mild visible tremor to outstretched hands  Psychiatric: Oriented to person, place, and time. Affect appropriate.  COWS score of 4.       LAB:  All pertinent labs reviewed and interpreted.       RADIOLOGY:  Reviewed all pertinent imaging. Please see official radiology report.  No orders to display           IRomero, am serving as a scribe to document services personally performed by Pavan Louise MD based on my observation and the provider's statements to me. I, Dr. Pavan Louise, attest that Romero Dacosta is acting in a scribe capacity, has observed my performance of the services and has documented them in accordance with my direction.    Pavan Louise MD  Emergency Medicine  [facility] EMERGENCY DEPARTMENT  [facility] [facility] [address]  [phone]       Pavan Louise MD  06/28/22 2617

## 2022-06-28 NOTE — ED TRIAGE NOTES
Pt states withdrawing from opiates. Has appt on Friday at  Clinic,  Last used oxycodone on Sunday.     Triage Assessment     Row Name 06/28/22 3910       Triage Assessment (Adult)    Airway WDL WDL

## 2022-06-29 NOTE — DISCHARGE INSTRUCTIONS
As we discussed congratulations on your sobriety.  Recommend Suboxone twice a day.  If you do relapse and overdose make sure you administer Narcan or have your friends administer Narcan to you and call 911.  Keep your addiction medicine appointment on Friday.

## 2022-06-30 ENCOUNTER — HOSPITAL ENCOUNTER (EMERGENCY)
Facility: HOSPITAL | Age: 42
Discharge: HOME | End: 2022-06-30
Attending: EMERGENCY MEDICINE | Admitting: EMERGENCY MEDICINE
Payer: COMMERCIAL

## 2022-06-30 VITALS
HEART RATE: 71 BPM | TEMPERATURE: 98.3 F | SYSTOLIC BLOOD PRESSURE: 118 MMHG | BODY MASS INDEX: 18.78 KG/M2 | OXYGEN SATURATION: 100 % | HEIGHT: 64 IN | RESPIRATION RATE: 18 BRPM | DIASTOLIC BLOOD PRESSURE: 77 MMHG | WEIGHT: 110 LBS

## 2022-06-30 DIAGNOSIS — F11.20 UNCOMPLICATED OPIOID DEPENDENCE (H): ICD-10-CM

## 2022-06-30 PROCEDURE — 250N000013 HC RX MED GY IP 250 OP 250 PS 637: Performed by: EMERGENCY MEDICINE

## 2022-06-30 PROCEDURE — 99283 EMERGENCY DEPT VISIT LOW MDM: CPT

## 2022-06-30 RX ADMIN — BUPRENORPHINE HYDROCHLORIDE 4 MG: 8 TABLET SUBLINGUAL at 19:20

## 2022-06-30 NOTE — ED PROVIDER NOTES
"EMERGENCY DEPARTMENT NOTE     Name: Jeff Chavez    Age/Sex: 42 year old male   MRN: [id number]   Evaluation Date & Time:  No admission date for patient encounter.    PCP:    Med[facility] Bae[facility] [place]   ED Provider: David Maguire D.O.       CHIEF COMPLAINT    Medication Concern       DIAGNOSIS & DISPOSITION     1. Uncomplicated opioid dependence (H)      DISPOSITION: Home    At the conclusion of the encounter I discussed the results of all of the tests and the disposition. The questions were answered. The patient or family acknowledged understanding and was agreeable with the care plan.    TOTAL CRITICAL CARE TIME (EXCLUDING PROCEDURES): Not applicable    PROCEDURES:   None    EMERGENCY DEPARTMENT COURSE/MEDICAL DECISION MAKING   6:58 PM I met with the patient to gather history and to perform my initial exam.  We discussed treatment options and the plan for care while in the Emergency Department.    Jeff Chavez is a 42 year old male with relevant past history of opiate dependency, chronic headaches who presents to the emergency department for evaluation for medication adjustments  Triage note reviewed:Patient arrives to triage with chief complaint of medication concern with Suboxone which was prescribed Monday this week.  Has an appointment with primary tomorrow to discuss starting  Subutex instead.  Reports increased migraines r/t Suboxone.  Patient was told to stop taking Suboxone today and to come to ED.  Alert and oriented x4.         Vital signs:/77   Pulse 71   Temp 98.3  F (36.8  C) (Oral)   Resp 18   Ht 1.626 m (5' 4\")   Wt 49.9 kg (110 lb)   SpO2 100%   BMI 18.88 kg/m    Pertinent physical exam findings:  HEENT: Head atraumatic, neck supple without meningeal irritation  Neuro: Cranial nerves 2 through 12 are intact.  5 out of 5 motor strength upper and lower extremities.  Intact sensation to light touch and positional sense.  No pronator drift.  Normal cerebellar " function with serial fingers and heel-to-shin.  Diagnostic studies:  Imaging:  No orders to display      Lab:  Labs Ordered and Resulted from Time of ED Arrival to Time of ED Departure - No data to display   Interventions: Buprenorphine  Medical decision making: Patient has history of chronic headaches which are longstanding and in typical pattern.  Low suspicion for other process including subarachnoid hemorrhage or leakage, brain mass with increased ICP or other acute process.  Patient is scheduled to see addiction specialist tomorrow.  He has had chronic ongoing headaches which are somewhat worse after starting the Suboxone recently prescribed.  Patient reports no use of Suboxone today.  In discussion with the addiction specialist it was felt that changed to Subutex may be helpful.  Patient was given 1 dose of Subutex here in the emergency department and will follow-up with the addiction specialist as scheduled tomorrow.  Patient will also continue Tylenol and ibuprofen for headache management.  Patient will return to the emergency department if problems with follow-up or worsening headaches.      ED INTERVENTIONS     Medications   buprenorphine (SUBUTEX) sublingual half-tab 4 mg (4 mg Sublingual Given 6/30/22 1920)       DISCHARGE MEDICATIONS        Review of your medicines      UNREVIEWED medicines. Ask your doctor about these medicines      Dose / Directions   buprenorphine HCl-naloxone HCl 4-1 MG per film  Commonly known as: SUBOXONE      Dose: 1 Film  Place 1 Film under the tongue 2 times daily  Quantity: 10 Film  Refills: 0     HYDROcodone-acetaminophen 5-325 MG tablet  Commonly known as: NORCO      Dose: 1 tablet  Take 1 tablet by mouth  Refills: 0     ibuprofen 200 MG capsule  Commonly known as: ADVIL/MOTRIN      Dose: 200 mg  Take 200 mg by mouth as needed  Refills: 0     naloxone 4 MG/0.1ML nasal spray  Commonly known as: NARCAN      Dose: 4 mg  Spray 1 spray (4 mg) into one nostril alternating nostrils  as needed for opioid reversal every 2-3 minutes until assistance arrives  Quantity: 0.2 mL  Refills: 0     nicotine 21 MG/24HR 24 hr patch  Commonly known as: NICODERM CQ      Dose: 1 patch  Place 1 patch onto the skin every 24 hours  Refills: 0     omeprazole 20 MG DR capsule  Commonly known as: priLOSEC      Dose: 20 mg  Take 20 mg by mouth  Refills: 0     ondansetron 4 MG ODT tab  Commonly known as: ZOFRAN ODT  Used for: Nausea and vomiting, intractability of vomiting not specified, unspecified vomiting type      Dose: 4 mg  Take 1 tablet (4 mg) by mouth every 8 hours as needed for nausea or vomiting  Quantity: 10 tablet  Refills: 0     ondansetron 4 MG tablet  Commonly known as: ZOFRAN      Refills: 0     promethazine 25 MG tablet  Commonly known as: PHENERGAN      Dose: 12.5-25 mg  Take 12.5-25 mg by mouth  Refills: 0     * sucralfate 1 GM tablet  Commonly known as: CARAFATE      Dose: 1 g  Take 1 g by mouth  Refills: 0     * sucralfate 1 GM tablet  Commonly known as: CARAFATE      Dose: 1 g  Take 1 tablet (1 g) by mouth 2 times daily as needed  Quantity: 20 tablet  Refills: 0     ZOLMitriptan 5 MG tablet  Commonly known as: ZOMIG      Dose: 5 mg  Take 5 mg by mouth  Refills: 0         * This list has 2 medication(s) that are the same as other medications prescribed for you. Read the directions carefully, and ask your doctor or other care provider to review them with you.                  INFORMATION SOURCE AND LIMITATIONS    History/Exam limitations: None  Patient information was obtained from: Patient  Use of : N/A    HISTORY OF PRESENT ILLNESS   Jeff Chavez is a 42 year old year old male who presents to this ED via walk-in for evaluation of a medication concern.    Patient states he has been taking suboxone (4 mg 2x a day) for opioid withdrawal but it has been giving him severe headaches. States he needs subutex until he can meet with his doctor tomorrow and get a new prescription. States he  "last took suboxone last night.        REVIEW OF SYSTEMS:   Constitutional: Negative for  fever.   HENT: Negative for URI symptoms or sore throat.    Cardiac: Negative for  chest pain,palpitations, near syncope or syncope  Respiratory: Negative for cough and shortness of breath.    Gastrointestinal: Negative for abdominal pain, nausea, vomiting, constipation, diarrhea, rectal bleeding or melena.  Genitourinary: Negative for dysuria, flank pain and hematuria.   Musculoskeletal: Negative for back pain.   Skin: Negative for  rash  Neurological: Negative for dizziness, syncope, speech difficulty, unilateral weakness or imbalance with walking. Positive for headaches.  Hematological: Negative for adenopathy. Does not bruise/bleed easily.   Psychiatric/Behavioral: Negative for confusion.       PATIENT HISTORY     Past Medical History:   Diagnosis Date     Attention deficit disorder with hyperactivity 3/29/2015     Closed fracture of part of tibia 12/2/2014     Equinus deformity of foot, acquired 1/23/2015     Major depressive disorder, single episode 3/29/2015     Migraine      Nephrolithiasis      Stomach ulcer      Tobacco use disorder 3/29/2015     Undersocialized conduct disorder, aggressive type 4/13/2007     Patient Active Problem List   Diagnosis     Equinus deformity of foot, acquired     Attention deficit disorder with hyperactivity     Major depressive disorder, single episode     Closed fracture of part of tibia     Tobacco use disorder     Undersocialized conduct disorder, aggressive type     Right flank pain     Hematuria     History reviewed. No pertinent surgical history.  Social Histrory  Smoking:Yes  Alcohol Use:NA  Allergies   Allergen Reactions     Other Food Allergy Hives     Strawberries     Strawberry Hives     Tramadol Other (See Comments)     \"seizures\"         OUTPATIENT MEDICATIONS     Discharge Medication List as of 6/30/2022  7:22 PM         Vitals:    06/30/22 1743 06/30/22 1921   BP: 137/80 " "118/77   Pulse: 84 71   Resp: 18 18   Temp: 98.3  F (36.8  C)    TempSrc: Oral    SpO2: 98% 100%   Weight: 49.9 kg (110 lb)    Height: 1.626 m (5' 4\")        Physical Exam   Constitutional: Oriented to person, place, and time. Appears well-developed and well-nourished.   HEENT:    Head: Atraumatic.   Neck: Normal range of motion. Neck supple.   Cardiovascular: Normal rate, regular rhythm and normal heart sounds.    Pulmonary/Chest: Normal effort  and breath sounds normal.   Abdominal: Soft. Bowel sounds are normal.   Musculoskeletal: Normal range of motion.   Neurological: Alert and oriented to person, place, and time. Normal strength.No sensory deficit. No cranial nerve deficit . Skin: Skin is warm and dry.   Psychiatric: Normal mood and affect. Behavior is normal. Thought content normal.       DIAGNOSTICS    LABORATORY FINDINGS (REVIEWED AND INTERPRETED):  Labs Ordered and Resulted from Time of ED Arrival to Time of ED Departure - No data to display      IMAGING (REVIEWED AND INTERPRETED):  No orders to display               IRomero, am serving as a scribe to document services personally performed by David Maguire D.O., based on my observation and the provider s statements to me.    David DODD D.O., attest that Romero Dacosta is acting in a scribe capacity, has observed my performance of the services and has documented them in accordance with my direction.    David Maguire D.O.  EMERGENCY MEDICINE   06/30/22  [facility] EMERGENCY DEPARTMENT  [address] [place] [id number]  [phone]  Dept: [phone]       David Maguire DO  07/01/22 0351    "

## 2022-06-30 NOTE — ED TRIAGE NOTES
Patient arrives to triage with chief complaint of medication concern with Suboxone which was prescribed Monday this week.  Has an appointment with primary tomorrow to discuss starting  Subutex instead.  Reports increased migraines r/t Suboxone.  Patient was told to stop taking Suboxone today and to come to ED.  Alert and oriented x4.

## 2022-06-30 NOTE — ED NOTES
"ED Provider In Triage Note  [facility]  Encounter Date: Jun 30, 2022    Chief Complaint   Patient presents with     Medication Concern       Brief HPI:   Jeff Chavez is a 42 year old male presenting to the Emergency Department with a chief complaint of medication issue.  Patient was recently started on Suboxone for his history of opioid dependence.  However, this is made his chronic headaches worse.  He spoke to his pain clinic today and was referred to the emergency department for further evaluation.    Brief Physical Exam:  /80   Pulse 84   Temp 98.3  F (36.8  C) (Oral)   Resp 18   Ht 1.626 m (5' 4\")   Wt 49.9 kg (110 lb)   SpO2 98%   BMI 18.88 kg/m    General: Non-toxic appearing  HEENT: Atraumatic  Resp: No respiratory distress  Abdomen: Non-peritoneal  Neuro: Alert, oriented, answers questions appropriately  Psych: Behavior appropriate      Plan Initiated in Triage:  No orders of the defined types were placed in this encounter.      PIT Dispo:   Return to lobby while awaiting workup and ED bed availability    EVAN FABIAN MD on 6/30/2022 at 5:47 PM    Patient was evaluated by the Physician in Triage due to a limitation of available rooms in the Emergency Department. A plan of care was discussed based on the information obtained on the initial evaluation and patient was consuled to return back to the Emergency Department lobby after this initial evalutaiton until results were obtained or a room became available in the Emergency Department. Patient was counseled not to leave prior to receiving the results of their workup.     EVAN FABIAN MD  [facility] EMERGENCY DEPARTMENT  [address] [place] [id number]  [phone]     Evan Fabian MD  06/30/22 1751    "

## 2022-07-01 NOTE — DISCHARGE INSTRUCTIONS
Follow-up with the [facility] addiction clinic tomorrow as scheduled.  Return to the emergency department if you have progressive symptoms or problems with follow-up.

## 2023-04-25 ENCOUNTER — TELEPHONE (OUTPATIENT)
Dept: INTERNAL MEDICINE | Facility: CLINIC | Age: 43
End: 2023-04-25

## 2023-04-25 ENCOUNTER — NURSE TRIAGE (OUTPATIENT)
Dept: FAMILY MEDICINE | Facility: CLINIC | Age: 43
End: 2023-04-25

## 2023-04-25 DIAGNOSIS — R51.9 CHRONIC NONINTRACTABLE HEADACHE, UNSPECIFIED HEADACHE TYPE: Primary | ICD-10-CM

## 2023-04-25 DIAGNOSIS — G89.29 CHRONIC NONINTRACTABLE HEADACHE, UNSPECIFIED HEADACHE TYPE: Primary | ICD-10-CM

## 2023-04-25 NOTE — TELEPHONE ENCOUNTER
"Dr. Painter -   Pt calling to see if you can prescribe Norco for severe migraine he has had for 3 days  Pain is severe enough that pt states he wonders if he is better off dead than feeling this pain anymore. Not depressed and no thought or plan of self harm, \"just wants the pain to stop\".     States was previous patient at another clinic and unable to get in to be seen today so just asking for rx until seen    States understands he has been labeled an addict by other providers at [facility] but he is not and just needs it for the pain today.    Per protocol, call back form PCP within 1 hour - pt not seen at [facility] but asking Dr. Painter to fill request since seen by provider at other clinic in past.    Advised ER if pain is so severe or having thoughts of self harm - pt declined. Only wants to see Dr. Painter    Had vv today at [facility]    Reason for Disposition    SEVERE headache (e.g., excruciating) and has had severe headaches before    Answer Assessment - Initial Assessment Questions  1. LOCATION: \"Where does it hurt?\"       everywhere  2. ONSET: \"When did the headache start?\" (Minutes, hours or days)       3 days ago  3. PATTERN: \"Does the pain come and go, or has it been constant since it started?\"      constant  4. SEVERITY: \"How bad is the pain?\" and \"What does it keep you from doing?\"  (e.g., Scale 1-10; mild, moderate, or severe)    - MILD (1-3): doesn't interfere with normal activities     - MODERATE (4-7): interferes with normal activities or awakens from sleep     - SEVERE (8-10): excruciating pain, unable to do any normal activities         severe  5. RECURRENT SYMPTOM: \"Have you ever had headaches before?\" If Yes, ask: \"When was the last time?\" and \"What happened that time?\"       Yes has hx - Norco only thing that works  6. CAUSE: \"What do you think is causing the headache?\"      **  7. MIGRAINE: \"Have you been diagnosed with migraine headaches?\" If Yes, ask: \"Is this headache " "similar?\"       yes  8. HEAD INJURY: \"Has there been any recent injury to the head?\"   no      9. OTHER SYMPTOMS: \"Do you have any other symptoms?\" (fever, stiff neck, eye pain, sore throat, cold symptoms)        10. PREGNANCY: \"Is there any chance you are pregnant?\" \"When was your last menstrual period?\"        n/a    Protocols used: HEADACHE-A-OH    Betsy ALRA RN  [facility]    "

## 2023-04-25 NOTE — TELEPHONE ENCOUNTER
Pt seen by me at [facility]. Care subsequently taken over by Dr. Carrasco at [facility]. Pt has been diagnosed with opioid use disorder and was prescribed Suboxone in past. I do not think it's appropriate for him to use hydrocodone for headaches. I think risk outweighs benefit. If he wants to schedule visit for opioid use disorder, I am open to that. If he wants to schedule visit for headaches, I think neurology would be best option as he had previously tried & failed multiple headache meds while under my care. Thanks.

## 2023-04-25 NOTE — TELEPHONE ENCOUNTER
Writer asked to contact patient by Dr. Ortiz to inquire nature of appointment, see documentation below.    Patient states he has been prescribed hydrocodone in the past which has helped with his migraines.  Patient is wanting to get more medication.  Patient states he has seen a neurologist in the past and is not wanting a referral.  Writer did inform patient Dr. Ortiz would not be prescribing any narcotics for the patient today.  Patient states he would make an appointment with a provider who he has seen in the past for his migraines.  Patient's visit with Dr. Ortiz was canceled.    Francisco Ortiz MD [facility] [place] [facility]; Trixie Franklin LPN  This is a patient who has not been followed in the [place] clinic who has an opioid use disorder who was placed into my clinic schedule today as a new patient for headaches.  What is this gentleman hoping to achieve with this visit?  Is he planning on changing clinics to the [facility]?  Is he hoping to get refills of narcotics, because if he is we will not be doing that for him?

## 2023-04-25 NOTE — TELEPHONE ENCOUNTER
Called patient and relayed message, with patient is open to neurology appointment. Pended neurology referral.    Thanks!  Barrera Mars RN   [facility]

## 2023-06-12 ENCOUNTER — OFFICE VISIT (OUTPATIENT)
Dept: FAMILY MEDICINE | Facility: CLINIC | Age: 43
End: 2023-06-12
Payer: COMMERCIAL

## 2023-06-12 VITALS
OXYGEN SATURATION: 98 % | HEART RATE: 77 BPM | WEIGHT: 107 LBS | SYSTOLIC BLOOD PRESSURE: 113 MMHG | TEMPERATURE: 98.6 F | DIASTOLIC BLOOD PRESSURE: 75 MMHG | BODY MASS INDEX: 18.37 KG/M2 | RESPIRATION RATE: 16 BRPM

## 2023-06-12 DIAGNOSIS — G43.001 MIGRAINE WITHOUT AURA AND WITH STATUS MIGRAINOSUS, NOT INTRACTABLE: Primary | ICD-10-CM

## 2023-06-12 PROCEDURE — 99213 OFFICE O/P EST LOW 20 MIN: CPT | Performed by: FAMILY MEDICINE

## 2023-06-12 RX ORDER — OXYCODONE HYDROCHLORIDE 5 MG/1
TABLET ORAL
COMMUNITY
Start: 2022-11-28 | End: 2023-06-12

## 2023-06-12 RX ORDER — KETOROLAC TROMETHAMINE 10 MG/1
TABLET, FILM COATED ORAL
COMMUNITY
Start: 2022-11-23

## 2023-06-12 RX ORDER — ACETAMINOPHEN 500 MG
500-1000 TABLET ORAL
COMMUNITY
Start: 2022-11-23

## 2023-06-12 RX ORDER — HYDROCODONE BITARTRATE AND ACETAMINOPHEN 5; 325 MG/1; MG/1
1 TABLET ORAL EVERY 6 HOURS PRN
Qty: 1 TABLET | Refills: 0 | Status: SHIPPED | OUTPATIENT
Start: 2023-06-12 | End: 2023-06-13

## 2023-06-12 RX ORDER — ONDANSETRON 4 MG/1
4 TABLET, ORALLY DISINTEGRATING ORAL EVERY 8 HOURS PRN
Qty: 10 TABLET | Refills: 0 | Status: SHIPPED | OUTPATIENT
Start: 2023-06-12

## 2023-06-12 RX ORDER — SUMATRIPTAN 100 MG/1
TABLET, FILM COATED ORAL
COMMUNITY

## 2023-06-12 NOTE — PROGRESS NOTES
"OUTPATIENT VISIT NOTE                                                   Date of Visit: 6/12/2023     Chief Complaint   Patient presents with:  Headache: Very painful and worse x 4 days now, feeling nauseous, no vomiting, no dizziness, sharp pain            History of Present Illness   Jeff Chavez is a 43 year old male c/o headache.  Has headache \"every day of life\"  Worsened about four days ago.  Nauseated.  Has used oral toradol, tylenol, imitrex, ibuprofen.           MEDICATIONS   Current Outpatient Medications   Medication     acetaminophen (TYLENOL) 500 MG tablet     HYDROcodone-acetaminophen (NORCO) 5-325 MG tablet     ibuprofen (ADVIL/MOTRIN) 200 MG capsule     ketorolac (TORADOL) 10 MG tablet     naloxone (NARCAN) 4 MG/0.1ML nasal spray     ondansetron (ZOFRAN ODT) 4 MG ODT tab     sucralfate (CARAFATE) 1 GM tablet     SUMAtriptan (IMITREX) 100 MG tablet     ZOLMitriptan (ZOMIG) 5 MG tablet     nicotine (NICODERM CQ) 21 MG/24HR 24 hr patch     omeprazole (PRILOSEC) 20 MG DR capsule     ondansetron (ZOFRAN) 4 MG tablet     promethazine (PHENERGAN) 25 MG tablet     sucralfate (CARAFATE) 1 GM tablet     No current facility-administered medications for this visit.         SOCIAL HISTORY   Social History     Tobacco Use     Smoking status: Every Day     Packs/day: 1.00     Types: Cigarettes     Smokeless tobacco: Never   Vaping Use     Vaping status: Not on file   Substance Use Topics     Alcohol use: No     Comment: Alcoholic Drinks/day: rare           Physical Exam   Vitals:    06/12/23 1802   BP: 113/75   Pulse: 77   Resp: 16   Temp: 98.6  F (37  C)   TempSrc: Oral   SpO2: 98%   Weight: 48.5 kg (107 lb)        GENERAL:   Alert. Oriented.  EYES: Clear  HENT:  Ears: R TM pearly gray. Normal landmarks. L TM pearly gray.  Normal landmarks  Nose: Clear.  Sinuses: Nontender.  Oropharynx:  No erythema. No exudate.  NECK: Supple. No adenopathy.  LUNGS: Clear to ascultation.  No crackles.  No wheezing  HEART: " RRR  SKIN:  No rash.   NEURO:  CN intact.  Sensation intact.  No focal weakness.  DTR's 2/4 throughout.           Assessment and Plan     Migraine without aura and with status migrainosus, not intractable  Checked .  Given one Norco.  zofran refilled.  To follow up with his primary.  - ondansetron (ZOFRAN ODT) 4 MG ODT tab  Dispense: 10 tablet; Refill: 0  - HYDROcodone-acetaminophen (NORCO) 5-325 MG tablet  Dispense: 1 tablet; Refill: 0                   Discussed signs / symptoms that warrant urgent / emergent medical attention.     Recheck if worsening or not improving.       Anupam Mon MD          Pertinent History     The following portions of the patient's history were reviewed and updated as appropriate: allergies, current medications, past family history, past medical history, past social history, past surgical history and problem list.

## 2023-06-12 NOTE — LETTER
June 12, 2023      Jeff Chavez  [address] [place] [address]        To Whom It May Concern:    Jeff RASHAD Chavez  was seen on 6/12/2023 .  Please excuse him.      Sincerely,        Anupam Mon MD

## 2023-09-22 ENCOUNTER — OFFICE VISIT (OUTPATIENT)
Dept: FAMILY MEDICINE | Facility: CLINIC | Age: 43
End: 2023-09-22
Payer: COMMERCIAL

## 2023-09-22 VITALS
RESPIRATION RATE: 18 BRPM | DIASTOLIC BLOOD PRESSURE: 71 MMHG | WEIGHT: 110.6 LBS | TEMPERATURE: 98.2 F | BODY MASS INDEX: 18.98 KG/M2 | SYSTOLIC BLOOD PRESSURE: 110 MMHG | OXYGEN SATURATION: 98 % | HEART RATE: 72 BPM

## 2023-09-22 DIAGNOSIS — J01.00 ACUTE NON-RECURRENT MAXILLARY SINUSITIS: Primary | ICD-10-CM

## 2023-09-22 PROCEDURE — 99213 OFFICE O/P EST LOW 20 MIN: CPT | Performed by: FAMILY MEDICINE

## 2023-09-23 NOTE — PROGRESS NOTES
Assessment:       Acute non-recurrent maxillary sinusitis  - amoxicillin-clavulanate (AUGMENTIN) 875-125 MG tablet  Dispense: 20 tablet; Refill: 0       Plan:     Symptoms consistent with acute bacterial sinusitis.  Patient started on Augmentin.  Recommend decongestants and ibuprofen as well for symptoms.  Follow-up if symptoms are getting worse or not continuing to improve.        MEDICATIONS:   Orders Placed This Encounter   Medications    amoxicillin-clavulanate (AUGMENTIN) 875-125 MG tablet     Sig: Take 1 tablet by mouth 2 times daily for 10 days     Dispense:  20 tablet     Refill:  0         Subjective:       43 year old male presents for evaluation of a 10-day history of nasal congestion and cough now with worsening pain and pressure in his face and chills and overall feeling rundown.  His cough is gotten a bit better.  As of breath or wheezing.  Denies ear pain or sore throat.  He has tried DayQuil and NyQuil for his symptoms which has not helped much.    Patient Active Problem List   Diagnosis    Equinus deformity of foot, acquired    Attention deficit disorder with hyperactivity    Major depressive disorder, single episode    Closed fracture of part of tibia    Tobacco use disorder    Undersocialized conduct disorder, aggressive type    Right flank pain    Hematuria       Past Medical History:   Diagnosis Date    Attention deficit disorder with hyperactivity 3/29/2015    Overview:  a system change updated this record. This will not affect patient care or billing. This comment can be deleted.      Closed fracture of part of tibia 12/2/2014    Equinus deformity of foot, acquired 1/23/2015    Major depressive disorder, single episode 3/29/2015    Migraine     Nephrolithiasis     Stomach ulcer     Tobacco use disorder 3/29/2015    Undersocialized conduct disorder, aggressive type 4/13/2007       No past surgical history on file.    Current Outpatient Medications   Medication    acetaminophen (TYLENOL) 500 MG  "tablet    amoxicillin-clavulanate (AUGMENTIN) 875-125 MG tablet    ibuprofen (ADVIL/MOTRIN) 200 MG capsule    ketorolac (TORADOL) 10 MG tablet    naloxone (NARCAN) 4 MG/0.1ML nasal spray    nicotine (NICODERM CQ) 21 MG/24HR 24 hr patch    omeprazole (PRILOSEC) 20 MG DR capsule    ondansetron (ZOFRAN ODT) 4 MG ODT tab    ondansetron (ZOFRAN) 4 MG tablet    promethazine (PHENERGAN) 25 MG tablet    sucralfate (CARAFATE) 1 GM tablet    sucralfate (CARAFATE) 1 GM tablet    SUMAtriptan (IMITREX) 100 MG tablet    ZOLMitriptan (ZOMIG) 5 MG tablet     No current facility-administered medications for this visit.       Allergies   Allergen Reactions    Strawberry Extract Hives    Tramadol Other (See Comments), Nausea and Vomiting and Unknown     \"seizures\"  \"seizures\"  seizure      Nsaids Other (See Comments) and Unknown     Informed NOT to use due to last bone graft.  Please check with patient before ordering or administering to patient .  Informed NOT to use due to last bone graft.  Please check with patient before ordering or administering to patient .  Informed NOT to use due to last bone graft.  Please check with patient before ordering or administering to patient .    Informed NOT to use due to last bone graft.  Please check with patient before ordering or administering to patient .    Other Food Allergy Hives     Strawberries    Tolmetin Other (See Comments)     Informed NOT to use due to last bone graft.  Please check with patient before ordering or administering to patient ., Informed NOT to use due to last bone graft.  Please check with patient before ordering or administering to patient .  Informed NOT to use due to last bone graft.  Please check with patient before ordering or administering to patient .  Informed NOT to use due to last bone graft.  Please check with patient before ordering or administering to patient .  Informed NOT to use due to last bone graft.  Please check with patient before ordering or " administering to patient .         No family history on file.    Social History     Socioeconomic History    Marital status: Single     Spouse name: None    Number of children: None    Years of education: None    Highest education level: None   Tobacco Use    Smoking status: Every Day     Packs/day: 1.00     Types: Cigarettes    Smokeless tobacco: Never   Substance and Sexual Activity    Alcohol use: No     Comment: Alcoholic Drinks/day: rare    Drug use: No         Review of Systems  Pertinent items are noted in HPI.      Objective:                     General Appearance:    /71 (BP Location: Right arm, Patient Position: Sitting, Cuff Size: Adult Regular)   Pulse 72   Temp 98.2  F (36.8  C) (Oral)   Resp 18   Wt 50.2 kg (110 lb 9.6 oz)   SpO2 98%   BMI 18.98 kg/m          Alert, pleasant, cooperative, no distress, appears stated age   Head:    Normocephalic, without obvious abnormality, atraumatic   Eyes:    Conjunctiva/corneas clear   Ears:    Normal TM's without erythema or bulging. Normal external ear canals, both ears   Nose: Bilateral maxillary sinus tenderness is noted.   Throat:   Lips, mucosa, and tongue normal; teeth and gums normal.  No tonsilar hypertrophy or exudate.   Neck:   Supple, symmetrical, trachea midline, no adenopathy    Lungs:     Clear to auscultation bilaterally without wheezes, rales, or rhonchi, respirations unlabored    Heart:    Regular rate and rhythm, S1 and S2 normal, no murmur, rub or gallop       Extremities:   Extremities normal, atraumatic, no cyanosis or edema   Skin:   Skin color, texture, turgor normal, no rashes or lesions         This note has been dictated using voice recognition software. Any grammatical or context distortions are unintentional and inherent to the software

## 2025-08-10 ENCOUNTER — OFFICE VISIT (OUTPATIENT)
Dept: URGENT CARE | Facility: URGENT CARE | Age: 45
End: 2025-08-10
Payer: COMMERCIAL

## 2025-08-10 VITALS
BODY MASS INDEX: 19.38 KG/M2 | WEIGHT: 113.5 LBS | OXYGEN SATURATION: 100 % | HEIGHT: 64 IN | RESPIRATION RATE: 16 BRPM | DIASTOLIC BLOOD PRESSURE: 72 MMHG | SYSTOLIC BLOOD PRESSURE: 111 MMHG | HEART RATE: 79 BPM | TEMPERATURE: 97.9 F

## 2025-08-10 DIAGNOSIS — G43.801 OTHER MIGRAINE WITH STATUS MIGRAINOSUS, NOT INTRACTABLE: Primary | ICD-10-CM

## 2025-08-10 PROCEDURE — 3074F SYST BP LT 130 MM HG: CPT | Performed by: PHYSICIAN ASSISTANT

## 2025-08-10 PROCEDURE — 3078F DIAST BP <80 MM HG: CPT | Performed by: PHYSICIAN ASSISTANT

## 2025-08-10 PROCEDURE — 99214 OFFICE O/P EST MOD 30 MIN: CPT | Mod: 25 | Performed by: PHYSICIAN ASSISTANT

## 2025-08-10 PROCEDURE — 96372 THER/PROPH/DIAG INJ SC/IM: CPT | Performed by: PHYSICIAN ASSISTANT

## 2025-08-10 RX ORDER — KETOROLAC TROMETHAMINE 30 MG/ML
30 INJECTION, SOLUTION INTRAMUSCULAR; INTRAVENOUS ONCE
Status: COMPLETED | OUTPATIENT
Start: 2025-08-10 | End: 2025-08-10

## 2025-08-10 RX ORDER — DEXAMETHASONE SODIUM PHOSPHATE 10 MG/ML
10 INJECTION INTRAMUSCULAR; INTRAVENOUS ONCE
Status: DISCONTINUED | OUTPATIENT
Start: 2025-08-10 | End: 2025-08-10

## 2025-08-10 RX ADMIN — KETOROLAC TROMETHAMINE 30 MG: 30 INJECTION, SOLUTION INTRAMUSCULAR; INTRAVENOUS at 18:11
